# Patient Record
Sex: MALE | Race: WHITE | NOT HISPANIC OR LATINO
[De-identification: names, ages, dates, MRNs, and addresses within clinical notes are randomized per-mention and may not be internally consistent; named-entity substitution may affect disease eponyms.]

---

## 2019-05-22 ENCOUNTER — APPOINTMENT (OUTPATIENT)
Dept: INTERNAL MEDICINE | Facility: CLINIC | Age: 59
End: 2019-05-22

## 2019-06-03 ENCOUNTER — APPOINTMENT (OUTPATIENT)
Dept: OTOLARYNGOLOGY | Facility: CLINIC | Age: 59
End: 2019-06-03
Payer: COMMERCIAL

## 2019-06-03 ENCOUNTER — TRANSCRIPTION ENCOUNTER (OUTPATIENT)
Age: 59
End: 2019-06-03

## 2019-06-03 VITALS
DIASTOLIC BLOOD PRESSURE: 90 MMHG | HEIGHT: 71 IN | OXYGEN SATURATION: 98 % | SYSTOLIC BLOOD PRESSURE: 160 MMHG | WEIGHT: 277 LBS | BODY MASS INDEX: 38.78 KG/M2 | HEART RATE: 88 BPM

## 2019-06-03 DIAGNOSIS — Z82.49 FAMILY HISTORY OF ISCHEMIC HEART DISEASE AND OTHER DISEASES OF THE CIRCULATORY SYSTEM: ICD-10-CM

## 2019-06-03 DIAGNOSIS — Z78.9 OTHER SPECIFIED HEALTH STATUS: ICD-10-CM

## 2019-06-03 DIAGNOSIS — R09.81 NASAL CONGESTION: ICD-10-CM

## 2019-06-03 DIAGNOSIS — Z80.7 FAMILY HISTORY OF OTHER MALIGNANT NEOPLASMS OF LYMPHOID, HEMATOPOIETIC AND RELATED TISSUES: ICD-10-CM

## 2019-06-03 PROCEDURE — 99243 OFF/OP CNSLTJ NEW/EST LOW 30: CPT

## 2019-06-03 RX ORDER — FLUTICASONE PROPIONATE 50 UG/1
50 SPRAY, METERED NASAL DAILY
Qty: 1 | Refills: 11 | Status: ACTIVE | COMMUNITY
Start: 2019-06-03 | End: 1900-01-01

## 2019-06-03 NOTE — CONSULT LETTER
[Dear  ___] : Dear  [unfilled], [Consult Letter:] : I had the pleasure of evaluating your patient, [unfilled]. [Sincerely,] : Sincerely, [Consult Closing:] : Thank you very much for allowing me to participate in the care of this patient.  If you have any questions, please do not hesitate to contact me. [Please see my note below.] : Please see my note below. [FreeTextEntry2] : Alexis Valdez MD [FreeTextEntry3] : Micheal Nobles MD, FACS\par Clinical \par Dept. of Otolaryngology\par [facility]\par

## 2019-06-03 NOTE — REVIEW OF SYSTEMS
[Sneezing] : sneezing [Post Nasal Drip] : post nasal drip [Nasal Congestion] : nasal congestion [Sinus Pain] : sinus pain [Recurrent Sinus Infections] : recurrent sinus infections [Sinus Pressure] : sinus pressure [Discolored Nasal Discharge] : discolored nasal discharge [Wheezing] : wheezing [Heartburn] : heartburn [Joint Pain] : joint pain [Negative] : Heme/Lymph [Seasonal Allergies] : no seasonal allergies [Ear Itch] : no ear itch [Ear Pain] : no ear pain [Vertigo] : no vertigo [Hearing Loss] : no hearing loss [Ear Noises] : no ear noises [Recurrent Ear Infections] : no recurrent ear infections [Ear Drainage] : no ear drainage [Nose Bleeds] : no nose bleeds [Lightheadedness] : no lightheadedness [Problem Snoring] : no snoring problems [Sense Of Smell Problem] : no sense of smell problem [Snoring With Pauses] : no snoring with pauses [Hoarseness] : no hoarseness [Throat Clearing] : no throat clearing [Throat Pain] : no throat pain [Swelling Neck] : no neck swelling [Throat Dryness] : no throat dryness [Throat Itching] : no throat itching [Fever] : no fever [Swelling Face] : no face swelling [Chills] : no chills [Feeling Tired] : not feeling tired [Recent Weight Gain (___ Lbs)] : no recent weight gain [Feeling Poorly] : not feeling poorly [Red  Eyes] : eyes not red [Eye Pain] : no eye pain [Recent Weight Loss (___ Lbs)] : no recent weight loss [Discharge From Eyes] : no purulent discharge from the eyes [Eyesight Problems] : no eyesight problems [Dry Eyes] : no dryness of the eyes [Heart Rate Is Slow] : the heart rate was not slow [Eyes Itch] : no itching of the eyes [Heart Rate Is Fast] : the heart rate was not fast [Chest Pain] : no chest pain [Palpitations] : no palpitations [Leg Claudication] : no intermittent leg claudication [Cough] : no cough [Shortness Of Breath] : no shortness of breath [Lower Ext Edema] : no extremity edema [Orthopnea] : no orthopnea [SOB on Exertion] : no shortness of breath during exertion [PND] : no PND [Vomiting] : no vomiting [Constipation] : no constipation [Abdominal Pain] : no abdominal pain [Dysuria] : no dysuria [Melena] : no melena [Diarrhea] : no diarrhea [Incontinence] : no incontinence [Hesitancy] : no urinary hesitancy [Nocturia] : no nocturia [Testicular Pain] : no testicular pain [Genital Lesion] : no genital lesions [Arthralgias] : no arthralgias [Joint Swelling] : no joint swelling [Limb Pain] : no limb pain [Joint Stiffness] : no joint stiffness [Limb Swelling] : no limb swelling [Skin Lesions] : no skin lesions [Skin Wound] : no skin wound [Change In A Mole] : no change in a mole [Itching] : no itching [An Unusual Growth] : no unusual growth on the skin [Dry Skin] : no dry skin [Confused] : no confusion [Convulsions] : no convulsions [Dizziness] : no dizziness [Limb Weakness] : no limb weakness [Suicidal] : not suicidal [Fainting] : no fainting [Anxiety] : no anxiety [Sleep Disturbances] : no sleep disturbances [Change In Personality] : no personality change [Depression] : no depression [Proptosis] : no proptosis [Emotional Problems] : no emotional problems [Hot Flashes] : no hot flashes [Erectile Dysfunction] : no erectile dysfunction [Muscle Weakness] : no muscle weakness [Easy Bleeding] : no tendency for easy bleeding [Deepening Of The Voice] : no deepening of the voice [Feelings Of Weakness] : no feelings of weakness [Easy Bruising] : no tendency for easy bruising [Swollen Glands] : no swollen glands [Swollen Glands In The Neck] : no swollen glands in the neck [FreeTextEntry2] : Fatigue, Daytime sleepiness

## 2019-06-03 NOTE — HISTORY OF PRESENT ILLNESS
[de-identified] : 57 y/o M with a h/o frequent nasal congestion and recurrent sinus infections over the past 5 months.  He has had no Abx.  The congestion settles down on its own.

## 2019-06-03 NOTE — PHYSICAL EXAM
[Midline] : trachea located in midline position [Normal] : no rashes [de-identified] : Size 19 [] : septum deviated to the right [de-identified] : Pale edematous inferior turbs [de-identified] : Clear

## 2019-06-03 NOTE — REASON FOR VISIT
[Initial Consultation] : an initial consultation for [FreeTextEntry2] : nasal congestion and recurring sinonasal infections

## 2019-06-06 ENCOUNTER — APPOINTMENT (OUTPATIENT)
Dept: INTERNAL MEDICINE | Facility: CLINIC | Age: 59
End: 2019-06-06
Payer: COMMERCIAL

## 2019-06-06 VITALS
BODY MASS INDEX: 36.4 KG/M2 | HEIGHT: 71 IN | WEIGHT: 260 LBS | OXYGEN SATURATION: 97 % | HEART RATE: 73 BPM | DIASTOLIC BLOOD PRESSURE: 79 MMHG | SYSTOLIC BLOOD PRESSURE: 132 MMHG

## 2019-06-06 DIAGNOSIS — Z12.12 ENCOUNTER FOR SCREENING FOR MALIGNANT NEOPLASM OF COLON: ICD-10-CM

## 2019-06-06 DIAGNOSIS — Z12.11 ENCOUNTER FOR SCREENING FOR MALIGNANT NEOPLASM OF COLON: ICD-10-CM

## 2019-06-06 PROCEDURE — 99214 OFFICE O/P EST MOD 30 MIN: CPT

## 2019-06-06 NOTE — PHYSICAL EXAM
[General Appearance - In No Acute Distress] : in no acute distress [General Appearance - Alert] : alert [Extraocular Movements] : extraocular movements were intact [Sclera] : the sclera and conjunctiva were normal [PERRL With Normal Accommodation] : pupils were equal in size, round, and reactive to light [Neck Cervical Mass (___cm)] : no neck mass was observed [Neck Appearance] : the appearance of the neck was normal [Jugular Venous Distention Increased] : there was no jugular-venous distention [Thyroid Nodule] : there were no palpable thyroid nodules [Thyroid Diffuse Enlargement] : the thyroid was not enlarged [Auscultation Breath Sounds / Voice Sounds] : lungs were clear to auscultation bilaterally [Heart Rate And Rhythm] : heart rate was normal and rhythm regular [Heart Sounds] : normal S1 and S2 [Heart Sounds Gallop] : no gallops [Murmurs] : no murmurs [Heart Sounds Pericardial Friction Rub] : no pericardial rub [Abdomen Soft] : soft [Bowel Sounds] : normal bowel sounds [Abdomen Tenderness] : non-tender [Abdomen Mass (___ Cm)] : no abdominal mass palpated [Cervical Lymph Nodes Enlarged Anterior Bilaterally] : anterior cervical [Cervical Lymph Nodes Enlarged Posterior Bilaterally] : posterior cervical [Femoral Lymph Nodes Enlarged Bilaterally] : femoral [Axillary Lymph Nodes Enlarged Bilaterally] : axillary [Supraclavicular Lymph Nodes Enlarged Bilaterally] : supraclavicular [Inguinal Lymph Nodes Enlarged Bilaterally] : inguinal [No CVA Tenderness] : no ~M costovertebral angle tenderness [No Spinal Tenderness] : no spinal tenderness [Nail Clubbing] : no clubbing  or cyanosis of the fingernails [Abnormal Walk] : normal gait [Motor Tone] : muscle strength and tone were normal [Musculoskeletal - Swelling] : no joint swelling seen [Skin Turgor] : normal skin turgor [Skin Color & Pigmentation] : normal skin color and pigmentation [] : no rash [Sensation] : the sensory exam was normal to light touch and pinprick [Deep Tendon Reflexes (DTR)] : deep tendon reflexes were 2+ and symmetric [No Focal Deficits] : no focal deficits [Affect] : the affect was normal [Impaired Insight] : insight and judgment were intact [Oriented To Time, Place, And Person] : oriented to person, place, and time

## 2019-06-06 NOTE — ASSESSMENT
[FreeTextEntry1] : Colonoscopy full risk and benefits explained. \par Get records from his PMD prior to procedure\par

## 2019-06-07 RX ORDER — SODIUM PICOSULFATE, MAGNESIUM OXIDE, AND ANHYDROUS CITRIC ACID 10; 3.5; 12 MG/160ML; G/160ML; G/160ML
10-3.5-12 MG-GM LIQUID ORAL
Qty: 1 | Refills: 0 | Status: ACTIVE | COMMUNITY
Start: 2019-06-07 | End: 1900-01-01

## 2019-07-15 ENCOUNTER — APPOINTMENT (OUTPATIENT)
Dept: INTERNAL MEDICINE | Facility: AMBULATORY MEDICAL SERVICES | Age: 59
End: 2019-07-15
Payer: COMMERCIAL

## 2019-07-15 PROCEDURE — G0121 COLON CA SCRN NOT HI RSK IND: CPT | Mod: PT

## 2020-12-23 PROBLEM — Z12.11 ENCOUNTER FOR COLORECTAL CANCER SCREENING: Status: RESOLVED | Noted: 2019-06-06 | Resolved: 2020-12-23

## 2022-04-18 ENCOUNTER — APPOINTMENT (OUTPATIENT)
Dept: ORTHOPEDIC SURGERY | Facility: CLINIC | Age: 62
End: 2022-04-18
Payer: COMMERCIAL

## 2022-04-18 VITALS — BODY MASS INDEX: 36.4 KG/M2 | HEIGHT: 71 IN | WEIGHT: 260 LBS

## 2022-04-18 DIAGNOSIS — M17.10 UNILATERAL PRIMARY OSTEOARTHRITIS, UNSPECIFIED KNEE: ICD-10-CM

## 2022-04-18 PROCEDURE — 20611 DRAIN/INJ JOINT/BURSA W/US: CPT | Mod: 50

## 2022-04-18 PROCEDURE — 99204 OFFICE O/P NEW MOD 45 MIN: CPT | Mod: 25

## 2022-04-18 PROCEDURE — 73562 X-RAY EXAM OF KNEE 3: CPT | Mod: RT

## 2022-04-18 NOTE — PHYSICAL EXAM
[Right] : right knee [Left] : left knee [5___] : hamstring 5[unfilled]/5 [Bilateral] : knee bilaterally [Moderate tricompartmental OA medial narrowing] : Moderate tricompartmental OA medial narrowing [] : non-antalgic [TWNoteComboBox7] : flexion 120 degrees [de-identified] : extension 0 degrees

## 2022-04-18 NOTE — ASSESSMENT
[FreeTextEntry1] : BL knee injections today\par OTC topical NSAID for pes bursitis discussed\par Will follow up with Dr. Camacho in 4 weeks, if still sx.\par may discuss formal PT vs visco\par \par Procedure Name: Large Joint Injection / Aspiration: Depomedrol, Lidocaine with US guidance\par \par Large Joint Injection was performed because of pain and inflammation.\par Depomedrol: An injection of Depomedrol 40 mg , 2 cc.\par Lidocaine: An injection of Lidocaine 1 mg , 8 cc.\par \par Medication was injected in the bilatera knee. Patient has tried OTC's including aspirin, Ibuprofen, Aleve etc or prescription NSAIDS, and/or exercises at home and/ or physical therapy without satisfactory response. After verbal consent using sterile preparation and technique. The risks, benefits, and alternatives to cortisone injection were explained in full to the patient. Risks outlined include but are not limited to infection, sepsis, bleeding, scarring, skin discoloration, temporary increase in pain, syncopal episode, failure to resolve symptoms, allergic reaction, symptom recurrence, and elevation of blood sugar in diabetics. Patient understood the risks. All questions were answered. After discussion of options, patient requested an injection. Oral informed consent was obtained and sterile prep was done of the injection site. Sterile technique was utilized for the procedure including the preparation of the solutions used for the injection. Patient tolerated the procedure well. Advised to ice the injection site this evening. Prep with betadine locally to site. Sterile technique used. Patient tolerated procedure well. Post Procedure Instructions: Patient was advised to call if redness, pain, or fever occur and apply ice for 15 min. out of every hour for the next 12-24 hours as tolerated. Patient was advised to rest the joint(s) for 3 days.\par US guidance used for needle placement

## 2022-04-18 NOTE — HISTORY OF PRESENT ILLNESS
[7] : 7 [6] : 6 [FreeTextEntry5] : pt states he had patellar tendonitis few years ago. pt states new years he got new shoes and pain in his legs. pt c.o pain in b/l knee \par pt has seen dr knutson 2019

## 2022-04-18 NOTE — REASON FOR VISIT
[FreeTextEntry2] : This is a 61 year old M with bilateral knee pain.  He has known R knee OA and Dr. Camacho gave him an injection in 2019 that helped.  He works with a  for BL tight hamstrings.  No injury.

## 2022-07-29 ENCOUNTER — APPOINTMENT (OUTPATIENT)
Dept: ORTHOPEDIC SURGERY | Facility: CLINIC | Age: 62
End: 2022-07-29

## 2022-07-29 VITALS — WEIGHT: 260 LBS | HEIGHT: 71 IN | BODY MASS INDEX: 36.4 KG/M2

## 2022-07-29 DIAGNOSIS — M17.12 UNILATERAL PRIMARY OSTEOARTHRITIS, LEFT KNEE: ICD-10-CM

## 2022-07-29 DIAGNOSIS — S83.242S OTHER TEAR OF MEDIAL MENISCUS, CURRENT INJURY, LEFT KNEE, SEQUELA: ICD-10-CM

## 2022-07-29 PROCEDURE — 20611 DRAIN/INJ JOINT/BURSA W/US: CPT

## 2022-07-29 PROCEDURE — 99214 OFFICE O/P EST MOD 30 MIN: CPT | Mod: 25

## 2022-07-29 PROCEDURE — 99204 OFFICE O/P NEW MOD 45 MIN: CPT | Mod: 25

## 2022-07-29 NOTE — HISTORY OF PRESENT ILLNESS
[9] : 9 [de-identified] : left knee pain on and off had an injeciton 3 months ago and helped   feels the pain is more because he has tight hamstrings and quad   pain over the joint  [FreeTextEntry5] : pt c.o pain in b/l knee 2 weeks ago he states he had cramp in his hamstring, states NKI\par

## 2022-07-29 NOTE — DISCUSSION/SUMMARY
[Medication Risks Reviewed] : Medication risks reviewed [Surgical risks reviewed] : Surgical risks reviewed [de-identified] : The risks, benefits and contents of the injection have been discussed.  Risks include but are not limited to allergic reaction, flare reaction, permanent white skin discoloration at the injection site and infection.  The patient understands the risks and agrees to having the injection.  All questions have been answered.\par  recommend mri to rule out surgical pathology and further guide treatment of left knee to rule out medial meniscal tear\par \par will follwoup w knee specialist after the mri

## 2022-07-29 NOTE — PROCEDURE
[Large Joint Injection] : Large joint injection [Left] : of the left [Knee] : knee [X-ray evidence of Osteoarthritis on this or prior visit] : x-ray evidence of Osteoarthritis on this or prior visit [Alcohol] : alcohol [Betadine] : betadine [Ethyl Chloride sprayed topically] : ethyl chloride sprayed topically [Sterile technique used] : sterile technique used [___ cc    0.5%] : Bupivacaine (Marcaine) ~Vcc of 0.5%  [___ cc    6mg] :  Betamethasone (Celestone) ~Vcc of 6mg [] : Patient tolerated procedure well [Call if redness, pain or fever occur] : call if redness, pain or fever occur [Apply ice for 15min out of every hour for the next 12-24 hours as tolerated] : apply ice for 15 minutes out of every hour for the next 12-24 hours as tolerated [Patient was advised to rest the joint(s) for ____ days] : patient was advised to rest the joint(s) for [unfilled] days [Previous OTC use and PT nontherapeutic] : patient has tried OTC's including aspirin, Ibuprofen, Aleve, etc or prescription NSAIDS, and/or exercises at home and/or physical therapy without satisfactory response [Patient had decreased mobility in the joint] : patient had decreased mobility in the joint [Risks, benefits, alternatives discussed / Verbal consent obtained] : the risks benefits, and alternatives have been discussed, and verbal consent was obtained [Precise injection in area of tear] : precise injection in area of tear [Prior failure or difficult injection] : prior failure or difficult injection [All ultrasound images have been permanently captured and stored accordingly in our picture archiving and communication system] : All ultrasound images have been permanently captured and stored accordingly in our picture archiving and communication system [Pain] : pain [Visualization of the needle and placement of injection was performed without complication] : visualization of the needle and placement of injection was performed without complication [Inflammation] : inflammation

## 2022-11-04 ENCOUNTER — APPOINTMENT (OUTPATIENT)
Dept: ORTHOPEDIC SURGERY | Facility: CLINIC | Age: 62
End: 2022-11-04
Payer: COMMERCIAL

## 2022-11-04 VITALS — HEIGHT: 71 IN | WEIGHT: 260 LBS | BODY MASS INDEX: 36.4 KG/M2

## 2022-11-04 DIAGNOSIS — M70.50 OTHER BURSITIS OF KNEE, UNSPECIFIED KNEE: ICD-10-CM

## 2022-11-04 PROCEDURE — 99214 OFFICE O/P EST MOD 30 MIN: CPT | Mod: 25

## 2022-11-04 PROCEDURE — 20551 NJX 1 TENDON ORIGIN/INSJ: CPT | Mod: RT

## 2022-11-04 RX ORDER — CELECOXIB 200 MG/1
200 CAPSULE ORAL DAILY
Qty: 30 | Refills: 2 | Status: ACTIVE | COMMUNITY
Start: 2022-11-04 | End: 1900-01-01

## 2022-11-04 NOTE — HISTORY OF PRESENT ILLNESS
[7] : 7 [4] : 4 [Dull/Aching] : dull/aching [Localized] : localized [Full time] : Work status: full time [de-identified] : 11/4/2022: Pt denies hx of trauma.\par There is no hx of lower extremity numbness/tingling.\par pt denies any noted instability to either lower extremity.\par \par PMH:: Denied. \par Allergies: NKDA.  [] : Post Surgical Visit: no [FreeTextEntry1] : cheyenne knees [FreeTextEntry5] : Patient complains of cheyenne knee pain since 1 month ago left knee is worse\par no injury, h/o knee arthritis

## 2022-11-04 NOTE — REASON FOR VISIT
[FreeTextEntry2] : Pt here for evaluation of left knee pain. Mr. Wiggins is known to have bilateral knee OA.

## 2022-11-04 NOTE — ASSESSMENT
[FreeTextEntry1] : Pt provded left knee pes bursa CSI today and he has requested rx for PT x 6 weeks which is provided.\par Celebrex 200 mg /d prn pain provided. \par RTO prn

## 2022-11-04 NOTE — IMAGING
[de-identified] : No skin changes or bony deformity.\par Bilateral  hips with full and pain free ROM. \par There is TTP over  the left pes bursa region only.\par Anterior Drawer Test is negative\par Lachman Test is negative\par Posterior Drawer Test is negative.\par Cristino Test is negative.\par Apley Compression Test is not performed.\par Lower extremities are nvi\par Gait is normal. \par

## 2022-11-22 ENCOUNTER — APPOINTMENT (OUTPATIENT)
Dept: ORTHOPEDIC SURGERY | Facility: CLINIC | Age: 62
End: 2022-11-22

## 2022-11-22 PROCEDURE — J3490M: CUSTOM

## 2022-11-22 PROCEDURE — 20610 DRAIN/INJ JOINT/BURSA W/O US: CPT | Mod: 50

## 2022-11-22 PROCEDURE — 99214 OFFICE O/P EST MOD 30 MIN: CPT | Mod: 25

## 2022-11-22 NOTE — PHYSICAL EXAM
[Bilateral] : knee bilaterally [Negative] : negative Cristino's [] : patient ambulates without assistive device [TWNoteComboBox7] : flexion 125 degrees [de-identified] : extension 0 degrees

## 2022-11-22 NOTE — PROCEDURE
[de-identified] : Procedure Name: Large Joint Injection / Aspiration: Depomedrol and Marcaine\par Anesthesia: ethyl chloride sprayed topically.. \par Depomedrol: An injection of Depomedrol 80 mg , 1 cc. \par Marcaine: 5 cc. \par Medication was injected in bilateral knees. Patient has tried OTC's including aspirin, Ibuprofen, Aleve etc or prescriptionNSAIDS, and/or exercises at home and/ or physical therapy without satisfactory response. After verbal consent using sterile preparation and technique. The risks, benefits, and alternatives to cortisone injection were explained in full to the patient. Risks outlined include but are not limited to infection, sepsis, bleeding, scarring, skin discoloration, temporary  increase in pain, syncopal episode, failure to resolve symptoms, allergic reaction, symptom recurrence, and elevation of blood sugar in diabetics. Patient understood the risks. All questions were answered. After discussion of options, patient requested an injection. Oral informed consent was obtained and sterile prep was done of the injection site. Sterile technique was utilized for the procedure including the preparation of the solutions used for the injection. Patient tolerated the procedure well. Advised to ice the injection site this evening. Prep with alcohol locally to site. Sterile technique used. Post Procedure Instructions: Patient was advised to call if redness, pain, or fever occur and apply ice for 15 min. out of every hour for the next 12-24 hours as tolerated.

## 2022-11-22 NOTE — HISTORY OF PRESENT ILLNESS
[Left Leg] : left leg [Right Leg] : right leg [Gradual] : gradual [7] : 7 [6] : 6 [Localized] : localized [Intermittent] : intermittent [Physical therapy] : physical therapy [Standing] : standing [Walking] : walking [Stairs] : stairs [de-identified] : 11/22/22 HERE WITH BL KNEES PAIN \par PT HAS TRY CORTISONE INJECTIONS IN THE PAST WITH GOOD RELIEF [] : no [FreeTextEntry1] : KNEES [de-identified] : RACHEL DUNN RPA [de-identified] : X RAYS DONE AT [facility]  [de-identified] : PHYSICAL THERAPY

## 2022-11-22 NOTE — ASSESSMENT
[FreeTextEntry1] : 62 year M WITH MODERATE B/L KNEE PAIN. WENT TO [facility] ON 11/4/22. HAD LEFT KNEE PES BURSA CSI ON 11/4/22 WITH SOME RELIEF. PT WAS PRESCRIBED. HE IS TAKING CELEBREX. PAIN WORSENS WITH STAIRS AND WALKING PROLONGED DISTANCES. PAIN IS AFFECTING ADL AND FUNCTIONAL ACTIVITIES. XRAYS 4/18/22 REVIEWED WITH MEDIAL OA. TREATMENT OPTIONS REVIEWED. WEIGHT LOSS DISCUSSED. \par \par B/L KNEE CSI TODAY. PATIENT TOLERATED INJECTION WELL.

## 2023-01-03 ENCOUNTER — APPOINTMENT (OUTPATIENT)
Dept: ORTHOPEDIC SURGERY | Facility: CLINIC | Age: 63
End: 2023-01-03

## 2023-01-24 ENCOUNTER — APPOINTMENT (OUTPATIENT)
Dept: ORTHOPEDIC SURGERY | Facility: CLINIC | Age: 63
End: 2023-01-24
Payer: COMMERCIAL

## 2023-01-24 PROCEDURE — 99214 OFFICE O/P EST MOD 30 MIN: CPT

## 2023-01-24 NOTE — ASSESSMENT
[FreeTextEntry1] : 62 year M WITH MODERATE B/L KNEE PAIN. WENT TO [facility] ON 11/4/22. HAD LEFT KNEE PES BURSA CSI ON 11/4/22 WITH SOME RELIEF. PT WAS PRESCRIBED. HE IS TAKING CELEBREX. PAIN WORSENS WITH STAIRS AND WALKING PROLONGED DISTANCES. PAIN IS AFFECTING ADL AND FUNCTIONAL ACTIVITIES. XRAYS 4/18/22 REVIEWED WITH MEDIAL OA. TREATMENT OPTIONS REVIEWED. WEIGHT LOSS AGAIN DISCUSSED. \par \par B/L KNEE CSI LAST VISIT 11/22/22 WITH GOOD RELIEF. PT RX. \par WILL AUTH FOR B/L KNEE EUFLEXXA. THIS INJECTION IS MEDICALLY NECESSARY DUE TO SEVERE PAIN AND OA.

## 2023-01-24 NOTE — PHYSICAL EXAM
[Bilateral] : knee bilaterally [Negative] : negative Cristino's [] : no effusion [TWNoteComboBox7] : flexion 125 degrees [de-identified] : extension 0 degrees

## 2023-01-24 NOTE — PROCEDURE
[de-identified] : Procedure Name: Large Joint Injection / Aspiration: Depomedrol and Marcaine\par Anesthesia: ethyl chloride sprayed topically.. \par Depomedrol: An injection of Depomedrol 80 mg , 1 cc. \par Marcaine: 5 cc. \par Medication was injected in bilateral knees. Patient has tried OTC's including aspirin, Ibuprofen, Aleve etc or prescriptionNSAIDS, and/or exercises at home and/ or physical therapy without satisfactory response. After verbal consent using sterile preparation and technique. The risks, benefits, and alternatives to cortisone injection were explained in full to the patient. Risks outlined include but are not limited to infection, sepsis, bleeding, scarring, skin discoloration, temporary  increase in pain, syncopal episode, failure to resolve symptoms, allergic reaction, symptom recurrence, and elevation of blood sugar in diabetics. Patient understood the risks. All questions were answered. After discussion of options, patient requested an injection. Oral informed consent was obtained and sterile prep was done of the injection site. Sterile technique was utilized for the procedure including the preparation of the solutions used for the injection. Patient tolerated the procedure well. Advised to ice the injection site this evening. Prep with alcohol locally to site. Sterile technique used. Post Procedure Instructions: Patient was advised to call if redness, pain, or fever occur and apply ice for 15 min. out of every hour for the next 12-24 hours as tolerated.

## 2023-01-24 NOTE — HISTORY OF PRESENT ILLNESS
[Left Leg] : left leg [Right Leg] : right leg [Gradual] : gradual [2] : 2 [0] : 0 [Localized] : localized [Dull/Aching] : dull/aching [Intermittent] : intermittent [Physical therapy] : physical therapy [Standing] : standing [Walking] : walking [Stairs] : stairs [de-identified] : 11/22/22 HERE WITH BL KNEES PAIN \par PT HAS TRY CORTISONE INJECTIONS IN THE PAST WITH GOOD RELIEF\par \par 1/24/23 Pt is here for follow up of bilateral knees. Pain has improved. Pt received CSI on 11/22/22 with some relief.  [] : no [FreeTextEntry1] : KNEES [de-identified] : RACHEL DUNN RPA [de-identified] : X RAYS DONE AT [facility]  [de-identified] : CSI

## 2023-03-24 ENCOUNTER — APPOINTMENT (OUTPATIENT)
Dept: ORTHOPEDIC SURGERY | Facility: CLINIC | Age: 63
End: 2023-03-24
Payer: COMMERCIAL

## 2023-03-24 VITALS — WEIGHT: 260 LBS | HEIGHT: 71 IN | BODY MASS INDEX: 36.4 KG/M2

## 2023-03-24 PROCEDURE — 20610 DRAIN/INJ JOINT/BURSA W/O US: CPT | Mod: RT

## 2023-03-24 PROCEDURE — 99214 OFFICE O/P EST MOD 30 MIN: CPT | Mod: 25

## 2023-03-24 NOTE — ASSESSMENT
[FreeTextEntry1] : 62 year M WITH MODERATE B/L KNEE PAIN. WENT TO [facility] ON 11/4/22. HAD LEFT KNEE PES BURSA CSI ON 11/4/22 WITH SOME RELIEF. PT WAS PRESCRIBED. HE IS TAKING CELEBREX. PAIN WORSENS WITH STAIRS AND WALKING PROLONGED DISTANCES. PAIN IS AFFECTING ADL AND FUNCTIONAL ACTIVITIES. XRAYS 4/18/22 REVIEWED WITH MEDIAL OA. TREATMENT OPTIONS REVIEWED. WEIGHT LOSS AGAIN DISCUSSED. \par \par B/L KNEE VISCO-3 #1 TODAY. PATIENT TOLERATED INJECTION WELL.

## 2023-03-24 NOTE — PROCEDURE
[de-identified] : Procedure Name: Euflexxa (Large Joint)\par \par Viscosupplementation Injection: X-ray evidence of Osteoarthritis on this or prior visit, Patient has tried OTC's including aspirin, Ibuprofen, Aleve etc or prescription NSAIDS, and/or exercises at home and/ or physical therapy without satisfactory response and Repeat series performed because patient had significant improvement in their pain and functional capacity from prior series which was given more than six months ago. \par \par An injection of Euflexxa 2ml #1 was injected into the bilateral knee(s). after verbal consent using sterile technique. The risks, benefits, and alternatives to Viscosupplementation injection were explained in full to the patient. Risks outlined include but are not limited to infection, sepsis, bleeding, scarring, skin discoloration, temporary increase in pain, syncopal episode, failure to resolve symptoms, allergic reaction, and symptom recurrence. Signs and symptoms of infection reviewed and patient advised to call immediately for redness, fevers, and/or chills. Patient understood the risks. All questions were answered. After discussion of options, patient requested Viscosupplementation. The patient tolerated the procedure well. Ice tonight to the injection site. \par   [Bilateral] : bilaterally of the [Knee] : knee [Pain] : pain [Alcohol] : alcohol [Betadine] : betadine [Ethyl Chloride sprayed topically] : ethyl chloride sprayed topically [Sterile technique used] : sterile technique used [Visco-3 (25mg)] : 25mg of Visco-3 [#1] : series #1

## 2023-03-24 NOTE — PHYSICAL EXAM
[Bilateral] : knee bilaterally [Negative] : negative Cristino's [] : no effusion [TWNoteComboBox7] : flexion 125 degrees [de-identified] : extension 0 degrees

## 2023-03-30 ENCOUNTER — APPOINTMENT (OUTPATIENT)
Dept: ORTHOPEDIC SURGERY | Facility: CLINIC | Age: 63
End: 2023-03-30
Payer: COMMERCIAL

## 2023-03-30 VITALS — HEIGHT: 71 IN | BODY MASS INDEX: 36.4 KG/M2 | WEIGHT: 260 LBS

## 2023-03-30 PROCEDURE — 99212 OFFICE O/P EST SF 10 MIN: CPT | Mod: 25

## 2023-03-30 PROCEDURE — 20610 DRAIN/INJ JOINT/BURSA W/O US: CPT | Mod: 50

## 2023-03-30 NOTE — HISTORY OF PRESENT ILLNESS
[2] : 2 [0] : 0 [Dull/Aching] : dull/aching [Localized] : localized [Full time] : Work status: full time [] : Post Surgical Visit: no [FreeTextEntry1] : B/L Knees [FreeTextEntry3] : N/A Chronic [FreeTextEntry5] : 63 y/o RHD M eval B/l Knees. pt presents with atraumatic chronic pain due to HX of OA prior TX of Visco 3 #1 on 3/24/23. Visco 3 #2 today  [de-identified] : Visco 3 #1 3/24/23  [de-identified] :

## 2023-03-30 NOTE — ASSESSMENT
[FreeTextEntry1] : 62 year M WITH MODERATE B/L KNEE PAIN. WENT TO [facility] ON 11/4/22. HAD LEFT KNEE PES BURSA CSI ON 11/4/22 WITH SOME RELIEF. PT WAS PRESCRIBED. HE IS TAKING CELEBREX. PAIN WORSENS WITH STAIRS AND WALKING PROLONGED DISTANCES. PAIN IS AFFECTING ADL AND FUNCTIONAL ACTIVITIES. XRAYS 4/18/22 REVIEWED WITH MEDIAL OA. TREATMENT OPTIONS REVIEWED. WEIGHT LOSS AGAIN DISCUSSED. \par \par B/L KNEE VISCO-3 #2 TODAY. PATIENT TOLERATED INJECTION WELL.

## 2023-03-30 NOTE — PROCEDURE
[Bilateral] : bilaterally of the [Knee] : knee [Pain] : pain [Alcohol] : alcohol [Betadine] : betadine [Ethyl Chloride sprayed topically] : ethyl chloride sprayed topically [Sterile technique used] : sterile technique used [Visco-3 (25mg)] : 25mg of Visco-3 [#2] : series #2 [] : Patient tolerated procedure well [Call if redness, pain or fever occur] : call if redness, pain or fever occur [Apply ice for 15min out of every hour for the next 12-24 hours as tolerated] : apply ice for 15 minutes out of every hour for the next 12-24 hours as tolerated [Patient was advised to rest the joint(s) for ____ days] : patient was advised to rest the joint(s) for [unfilled] days [Previous OTC use and PT nontherapeutic] : patient has tried OTC's including aspirin, Ibuprofen, Aleve, etc or prescription NSAIDS, and/or exercises at home and/or physical therapy without satisfactory response [Patient had decreased mobility in the joint] : patient had decreased mobility in the joint [Risks, benefits, alternatives discussed / Verbal consent obtained] : the risks benefits, and alternatives have been discussed, and verbal consent was obtained

## 2023-03-30 NOTE — PHYSICAL EXAM
[Bilateral] : knee bilaterally [Negative] : negative Cristino's [] : patient ambulates without assistive device [TWNoteComboBox7] : flexion 125 degrees [de-identified] : extension 0 degrees

## 2023-04-07 ENCOUNTER — APPOINTMENT (OUTPATIENT)
Dept: ORTHOPEDIC SURGERY | Facility: CLINIC | Age: 63
End: 2023-04-07
Payer: COMMERCIAL

## 2023-04-07 PROCEDURE — 99211 OFF/OP EST MAY X REQ PHY/QHP: CPT | Mod: 25

## 2023-04-07 PROCEDURE — 20610 DRAIN/INJ JOINT/BURSA W/O US: CPT | Mod: 50

## 2023-04-07 NOTE — PHYSICAL EXAM
[Bilateral] : knee bilaterally [Negative] : negative Cristino's [] : no deformities of patellar tendon [TWNoteComboBox7] : flexion 125 degrees [de-identified] : extension 0 degrees

## 2023-04-07 NOTE — ASSESSMENT
[FreeTextEntry1] : 62 year M WITH MODERATE B/L KNEE PAIN. WENT TO [facility] ON 11/4/22. HAD LEFT KNEE PES BURSA CSI ON 11/4/22 WITH SOME RELIEF. PT WAS PRESCRIBED. HE IS TAKING CELEBREX. PAIN WORSENS WITH STAIRS AND WALKING PROLONGED DISTANCES. PAIN IS AFFECTING ADL AND FUNCTIONAL ACTIVITIES. XRAYS 4/18/22 REVIEWED WITH MEDIAL OA. TREATMENT OPTIONS REVIEWED. WEIGHT LOSS AGAIN DISCUSSED. \par \par B/L KNEE VISCO-3 #3 TODAY. PATIENT TOLERATED INJECTION WELL. \par TIMING/FREQ OF INJECTIONS DISCUSSED\par FU IN 6 WEEKS PLANNED IF STILL SX

## 2023-04-07 NOTE — PROCEDURE
[Bilateral] : bilaterally of the [Knee] : knee [Pain] : pain [Alcohol] : alcohol [Betadine] : betadine [Ethyl Chloride sprayed topically] : ethyl chloride sprayed topically [Sterile technique used] : sterile technique used [Visco-3 (25mg)] : 25mg of Visco-3 [#3] : series #3 [] : Patient tolerated procedure well [Call if redness, pain or fever occur] : call if redness, pain or fever occur [Apply ice for 15min out of every hour for the next 12-24 hours as tolerated] : apply ice for 15 minutes out of every hour for the next 12-24 hours as tolerated [Patient was advised to rest the joint(s) for ____ days] : patient was advised to rest the joint(s) for [unfilled] days [Previous OTC use and PT nontherapeutic] : patient has tried OTC's including aspirin, Ibuprofen, Aleve, etc or prescription NSAIDS, and/or exercises at home and/or physical therapy without satisfactory response [Patient had decreased mobility in the joint] : patient had decreased mobility in the joint [Risks, benefits, alternatives discussed / Verbal consent obtained] : the risks benefits, and alternatives have been discussed, and verbal consent was obtained

## 2023-06-10 ENCOUNTER — APPOINTMENT (OUTPATIENT)
Dept: ORTHOPEDIC SURGERY | Facility: CLINIC | Age: 63
End: 2023-06-10
Payer: COMMERCIAL

## 2023-06-10 VITALS — BODY MASS INDEX: 35.7 KG/M2 | WEIGHT: 255 LBS | HEIGHT: 71 IN

## 2023-06-10 DIAGNOSIS — M17.12 UNILATERAL PRIMARY OSTEOARTHRITIS, LEFT KNEE: ICD-10-CM

## 2023-06-10 DIAGNOSIS — M17.11 UNILATERAL PRIMARY OSTEOARTHRITIS, RIGHT KNEE: ICD-10-CM

## 2023-06-10 PROCEDURE — J3490M: CUSTOM

## 2023-06-10 PROCEDURE — 99213 OFFICE O/P EST LOW 20 MIN: CPT | Mod: 25

## 2023-06-10 PROCEDURE — 73562 X-RAY EXAM OF KNEE 3: CPT | Mod: 50

## 2023-06-10 PROCEDURE — 20610 DRAIN/INJ JOINT/BURSA W/O US: CPT | Mod: 50

## 2023-06-10 NOTE — HISTORY OF PRESENT ILLNESS
[Left Leg] : left leg [Right Leg] : right leg [Gradual] : gradual [2] : 2 [1] : 2 [Localized] : localized [Intermittent] : intermittent [Rest] : rest [Injection therapy] : injection therapy [Walking] : walking [de-identified] : 06/10/2023 pt presents here today with bl knees pain finished series of visco 3 injections back in march with good relief \par left knee is worse [] : no [FreeTextEntry1] : knees [FreeTextEntry5] : no injury [de-identified] : Dr Bedoya  [de-identified] : visco 3 injections

## 2023-06-10 NOTE — PHYSICAL EXAM
[Bilateral] : knee bilaterally [Negative] : negative Cristino's [] : patient ambulates without assistive device [TWNoteComboBox7] : flexion 125 degrees [de-identified] : extension 0 degrees

## 2023-11-03 ENCOUNTER — APPOINTMENT (OUTPATIENT)
Dept: ORTHOPEDIC SURGERY | Facility: CLINIC | Age: 63
End: 2023-11-03

## 2023-11-07 ENCOUNTER — APPOINTMENT (OUTPATIENT)
Dept: ORTHOPEDIC SURGERY | Facility: CLINIC | Age: 63
End: 2023-11-07

## 2024-12-19 ENCOUNTER — APPOINTMENT (OUTPATIENT)
Dept: ORTHOPEDIC SURGERY | Facility: CLINIC | Age: 64
End: 2024-12-19
Payer: COMMERCIAL

## 2024-12-19 VITALS — WEIGHT: 255 LBS | BODY MASS INDEX: 35.7 KG/M2 | HEIGHT: 71 IN

## 2024-12-19 DIAGNOSIS — M17.12 UNILATERAL PRIMARY OSTEOARTHRITIS, LEFT KNEE: ICD-10-CM

## 2024-12-19 PROCEDURE — 99213 OFFICE O/P EST LOW 20 MIN: CPT | Mod: 25

## 2024-12-19 PROCEDURE — J3490M: CUSTOM

## 2024-12-19 PROCEDURE — 73562 X-RAY EXAM OF KNEE 3: CPT | Mod: LT

## 2025-01-02 ENCOUNTER — APPOINTMENT (OUTPATIENT)
Dept: ORTHOPEDIC SURGERY | Facility: CLINIC | Age: 65
End: 2025-01-02
Payer: COMMERCIAL

## 2025-01-02 VITALS — HEIGHT: 71 IN | BODY MASS INDEX: 35.7 KG/M2 | WEIGHT: 255 LBS

## 2025-01-02 DIAGNOSIS — M17.12 UNILATERAL PRIMARY OSTEOARTHRITIS, LEFT KNEE: ICD-10-CM

## 2025-01-02 DIAGNOSIS — M17.11 UNILATERAL PRIMARY OSTEOARTHRITIS, RIGHT KNEE: ICD-10-CM

## 2025-01-02 PROCEDURE — 99214 OFFICE O/P EST MOD 30 MIN: CPT

## 2025-01-02 PROCEDURE — 73564 X-RAY EXAM KNEE 4 OR MORE: CPT | Mod: 50

## 2025-01-02 RX ORDER — MELOXICAM 15 MG/1
15 TABLET ORAL DAILY
Qty: 30 | Refills: 1 | Status: ACTIVE | COMMUNITY
Start: 2025-01-02 | End: 2025-03-03

## 2025-02-20 ENCOUNTER — APPOINTMENT (OUTPATIENT)
Dept: ORTHOPEDIC SURGERY | Facility: CLINIC | Age: 65
End: 2025-02-20
Payer: COMMERCIAL

## 2025-02-20 VITALS — WEIGHT: 255 LBS | BODY MASS INDEX: 35.7 KG/M2 | HEIGHT: 71 IN

## 2025-02-20 PROCEDURE — 99212 OFFICE O/P EST SF 10 MIN: CPT | Mod: 25

## 2025-02-20 PROCEDURE — 20611 DRAIN/INJ JOINT/BURSA W/US: CPT | Mod: 50

## 2025-02-27 ENCOUNTER — APPOINTMENT (OUTPATIENT)
Dept: ORTHOPEDIC SURGERY | Facility: CLINIC | Age: 65
End: 2025-02-27
Payer: COMMERCIAL

## 2025-02-27 VITALS — WEIGHT: 255 LBS | HEIGHT: 71 IN | BODY MASS INDEX: 35.7 KG/M2

## 2025-02-27 PROCEDURE — 20611 DRAIN/INJ JOINT/BURSA W/US: CPT | Mod: 50

## 2025-03-06 ENCOUNTER — APPOINTMENT (OUTPATIENT)
Dept: ORTHOPEDIC SURGERY | Facility: CLINIC | Age: 65
End: 2025-03-06
Payer: COMMERCIAL

## 2025-03-06 VITALS — BODY MASS INDEX: 35.7 KG/M2 | WEIGHT: 255 LBS | HEIGHT: 71 IN

## 2025-03-06 DIAGNOSIS — M17.11 UNILATERAL PRIMARY OSTEOARTHRITIS, RIGHT KNEE: ICD-10-CM

## 2025-03-06 DIAGNOSIS — M17.12 UNILATERAL PRIMARY OSTEOARTHRITIS, LEFT KNEE: ICD-10-CM

## 2025-03-06 PROCEDURE — 20611 DRAIN/INJ JOINT/BURSA W/US: CPT | Mod: 50

## 2025-03-06 PROCEDURE — 99212 OFFICE O/P EST SF 10 MIN: CPT | Mod: 25
